# Patient Record
Sex: FEMALE | Race: WHITE | Employment: FULL TIME | URBAN - METROPOLITAN AREA
[De-identification: names, ages, dates, MRNs, and addresses within clinical notes are randomized per-mention and may not be internally consistent; named-entity substitution may affect disease eponyms.]

---

## 2018-06-11 ENCOUNTER — HOSPITAL ENCOUNTER (OUTPATIENT)
Dept: PREADMISSION TESTING | Age: 28
Discharge: HOME | End: 2018-06-11
Payer: COMMERCIAL

## 2018-06-11 LAB
HCT VFR BLD AUTO: 39.4 % (ref 35–45)
HGB BLD-MCNC: 13.3 G/DL (ref 12–16)

## 2018-06-11 PROCEDURE — 85018 HEMOGLOBIN: CPT | Performed by: SURGERY

## 2018-06-11 PROCEDURE — 36415 COLL VENOUS BLD VENIPUNCTURE: CPT | Performed by: SURGERY

## 2018-06-13 NOTE — H&P
PATIENT: Nahed Hodge  YOB: 1990  DATE: 2018 9:45 AM   VISIT TYPE: Consult  _____________________________________________________________    Completed Orders (this encounter)  Order Jasmyn Pal Interpretation Result Next Lab Date   surgery instructions given education         Assessment/Plan  # Detail Type Description    1. Assessment Unspecified lump in the right breast, lower outer quadrant (N63.13). 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 Patient Plan right breast bx                  This 33year old female presents for Breast lump. History of Present Illness:  1. Breast lump      Onset: 1 month ago. Severity 4. Duration 1 Month. There is no radiation. Location is right breast. The patient describes it as mobile and smooth. It occurs constantly. The problem is with no change. Symptom is aggravated by touch. Symptom is aggravated by touch. Denies relieving factors. Pertinent negatives include bloody discharge, dimpling, fever, lymphadenopathy, nipple itching and nipple retraction. Additional information: bx shows a atypical cells within a cellular fibroadenoma. PAST MEDICAL/SURGICAL HISTORY  (Detailed)  Patient reported no relevant past medical/surgical history. Family History:  (Detailed)  Relationship Family Member Name  Age at Death Condition Onset Age Cause of Death       Family history of Hypertension  N       Family history of Diabetes mellitus  N       Family history of Heart disease  N       Family history of Cancer, unknown  N       Social History:  (Detailed)  Tobacco use reviewed. Preferred language is [place]. Smoking status: Never smoker. SMOKING STATUS  Use Status Type Smoking Status Usage Per Day Years Used Total Pack Years   no/never  Never smoker                Patient Status   Completed with information received for patient transitioning into care. Medication Reconciliation  Medications reconciled today. Medication Reviewed  Adherence Medication Name Sig Desc Elsewhere Status   taking as directed Vyvanse 30 mg capsule take 1 capsule by oral route  every day in the morning Y Verified   taking as directed norethin-ethinyl estradiol-iron 0.8 mg-25 mcg(24)/75 mg(4) chew tablet chew 1 tablet by oral route  every day Y Verified   taking as directed Claritin Liqui-Gel 10 mg capsule  Y Verified     Allergies:  Ingredient Reaction Medication Name Comment   NO KNOWN ALLERGIES      (Reviewed, no changes.)  Review of Systems  System Neg/Pos Details   Constitutional Negative Fever, night sweats and weight loss. ENMT Negative Hearing loss, tinnitus, vertigo and voice change. Eyes Negative Diplopia and vision loss. Respiratory Negative Asthma, cough, dyspnea, hemoptysis, known TB exposure and wheezing. Cardio Negative Chest pain, claudication, edema, irregular heartbeat/palpitations and thrombophlebitis. GI Negative Bloating, dysphagia, hemorrhoids, jaundice and reflux.  Negative Dysuria, nocturia, passage stone/gravel and urinary incontinence. Endocrine Negative Cold intolerance and goiter. Neuro Positive Headache. Neuro Negative Syncope. Integumentary Negative Bloody discharge from breast, breast dimpling, change in shape/size of mole(s), nipple itching, nipple retraction and skin lesion. MS Negative Back pain and bone/joint symptoms. Hema/Lymph Negative Easy bleeding, easy bruising and lymphadenopathy. Allergic/Immuno Negative Contact allergy and contact dermatitis. Reproductive Positive Breast lump(s) (Side: right), Menses (Last menses was 06/02/2018). Reproductive Negative Breast discharge and breast pain. Vital Signs     Gynecologic History   Last menses was 06/02/2018.      Height  Time ft in cm Last Measured Height Position   9:45 AM 5.0 5.00 165.10       Weight/BSA/BMI  Time lb oz kg Context BMI kg/m2 BSA m2   9:45 AM 144.00  65.317 dressed with shoes 23.96      Blood Pressure  Time BP mm/Hg Position Side Site Method Cuff Size   9:45 /85 sitting right arm automatic adult     Temperature/Pulse/Respiration  Time Temp F Temp C Temp Site Pulse/min Pattern Resp/ min   9:45 AM 99.50 37.50 ear 88 regular      Measured By  Time Measured by   9:45 AM Virginia Benson       Physical Exam:  Exam Findings Details   Constitutional Normal No acute distress. Well nourished. Well developed. Eyes Normal General - Right: Normal, Left: Normal. Sclera - Right: Normal, Left: Normal.   Neck Exam Normal Inspection - Normal. Palpation - Normal. Parotid gland - Normal. Thyroid gland - Normal. Submandibular lymph nodes - Normal. Cervical lymph nodes - Normal.   Lymph Detail Normal Occipital. Postauricular. Preauricular. Submental. Submandibular. Parotid. Anterior cervical. Posterior cervical. Supraclavicular. Axillary. Breast * Palpation - Right: mass. Breast Comments 6 ocl 2 cm mass, smooth, mobile   Breast Normal Inspection - Bilateral: Normal. Palpation - Left: Normal.. Nipples - Bilateral: Normal. Lymph nodes - Normal.   Respiratory Normal Cough - Absent. Effort - Normal.   Cardiovascular Normal Inspection - JVD: Absent. Heart rate - Regular rate. Rhythm - Regular. Skin * Inspection - General inspection: warm and dry. Musculoskeletal Normal Visual overview of all four extremities is normal. Gait - Normal.   Extremity Normal No Cyanosis. No Edema. Neurological Normal Level of consciousness - Normal. Orientation - Normal. Memory - Normal.   Psychiatric Normal Orientation - Oriented to time, place, person & situation. No agitation. Not anxious. Appropriate mood and affect.          Medications (added, continued, or stopped this visit):  Started Medication Directions PRN Status PRN Reason Instruction Stopped    Claritin Liqui-Gel 10 mg capsule  N       norethin-ethinyl estradiol-iron 0.8 mg-25 mcg(24)/75 mg(4) chew tablet chew 1 tablet by oral route  every day N       Vyvanse 30 mg capsule take 1 capsule by oral route  every day in the morning N      Completed by:        Obi Dillon 06/04/2018 1:59 PM   Document generated by:  Yvonne Bishop 06/04/2018 01:59 PM     -----------------------------------------------------------------------------------------------------------                          Electronically signed by Yvonne Bishop MD on 06/05/2018 11:45 AM

## 2018-06-14 ENCOUNTER — ANESTHESIA EVENT (OUTPATIENT)
Dept: SURGERY | Age: 28
End: 2018-06-14
Payer: COMMERCIAL

## 2018-06-14 ENCOUNTER — HOSPITAL ENCOUNTER (OUTPATIENT)
Age: 28
Setting detail: OUTPATIENT SURGERY
Discharge: HOME | End: 2018-06-14
Attending: SURGERY | Admitting: SURGERY
Payer: COMMERCIAL

## 2018-06-14 ENCOUNTER — ANESTHESIA (OUTPATIENT)
Dept: SURGERY | Age: 28
End: 2018-06-14
Payer: COMMERCIAL

## 2018-06-14 VITALS
DIASTOLIC BLOOD PRESSURE: 73 MMHG | HEART RATE: 80 BPM | TEMPERATURE: 97.8 F | RESPIRATION RATE: 16 BRPM | BODY MASS INDEX: 24.02 KG/M2 | WEIGHT: 144.19 LBS | OXYGEN SATURATION: 99 % | SYSTOLIC BLOOD PRESSURE: 114 MMHG | HEIGHT: 65 IN

## 2018-06-14 DIAGNOSIS — N63.10 BREAST MASS, RIGHT: Primary | ICD-10-CM

## 2018-06-14 LAB — HCG SERPL QL: NEGATIVE

## 2018-06-14 PROCEDURE — 77030012508 HC MSK AIRWY LMA AMBU -A: Performed by: ANESTHESIOLOGY

## 2018-06-14 PROCEDURE — 74011250636 HC RX REV CODE- 250/636

## 2018-06-14 PROCEDURE — 84703 CHORIONIC GONADOTROPIN ASSAY: CPT | Performed by: SURGERY

## 2018-06-14 PROCEDURE — 76010000138 HC OR TIME 0.5 TO 1 HR: Performed by: SURGERY

## 2018-06-14 PROCEDURE — 74011000258 HC RX REV CODE- 258: Performed by: ANESTHESIOLOGY

## 2018-06-14 PROCEDURE — 77030018836 HC SOL IRR NACL ICUM -A: Performed by: SURGERY

## 2018-06-14 PROCEDURE — 88305 TISSUE EXAM BY PATHOLOGIST: CPT | Performed by: SURGERY

## 2018-06-14 PROCEDURE — 74011250636 HC RX REV CODE- 250/636: Performed by: ANESTHESIOLOGY

## 2018-06-14 PROCEDURE — 74011250636 HC RX REV CODE- 250/636: Performed by: SURGERY

## 2018-06-14 PROCEDURE — 77030002933 HC SUT MCRYL J&J -A: Performed by: SURGERY

## 2018-06-14 PROCEDURE — 76210000020 HC REC RM PH II FIRST 0.5 HR: Performed by: SURGERY

## 2018-06-14 PROCEDURE — 77030002916 HC SUT ETHLN J&J -A: Performed by: SURGERY

## 2018-06-14 PROCEDURE — 76210000017 HC OR PH I REC 1.5 TO 2 HR: Performed by: SURGERY

## 2018-06-14 PROCEDURE — 77030011267 HC ELECTRD BLD COVD -A: Performed by: SURGERY

## 2018-06-14 PROCEDURE — 74011000250 HC RX REV CODE- 250: Performed by: SURGERY

## 2018-06-14 PROCEDURE — 76060000032 HC ANESTHESIA 0.5 TO 1 HR: Performed by: SURGERY

## 2018-06-14 PROCEDURE — 77030020782 HC GWN BAIR PAWS FLX 3M -B: Performed by: SURGERY

## 2018-06-14 RX ORDER — DEXAMETHASONE SODIUM PHOSPHATE 4 MG/ML
INJECTION, SOLUTION INTRA-ARTICULAR; INTRALESIONAL; INTRAMUSCULAR; INTRAVENOUS; SOFT TISSUE AS NEEDED
Status: DISCONTINUED | OUTPATIENT
Start: 2018-06-14 | End: 2018-06-14 | Stop reason: HOSPADM

## 2018-06-14 RX ORDER — MIDAZOLAM HYDROCHLORIDE 1 MG/ML
INJECTION, SOLUTION INTRAMUSCULAR; INTRAVENOUS AS NEEDED
Status: DISCONTINUED | OUTPATIENT
Start: 2018-06-14 | End: 2018-06-14 | Stop reason: HOSPADM

## 2018-06-14 RX ORDER — DIPHENHYDRAMINE HYDROCHLORIDE 50 MG/ML
12.5 INJECTION, SOLUTION INTRAMUSCULAR; INTRAVENOUS
Status: DISCONTINUED | OUTPATIENT
Start: 2018-06-14 | End: 2018-06-14 | Stop reason: HOSPADM

## 2018-06-14 RX ORDER — SODIUM CHLORIDE 0.9 % (FLUSH) 0.9 %
5-10 SYRINGE (ML) INJECTION AS NEEDED
Status: DISCONTINUED | OUTPATIENT
Start: 2018-06-14 | End: 2018-06-14 | Stop reason: HOSPADM

## 2018-06-14 RX ORDER — SODIUM CHLORIDE, SODIUM LACTATE, POTASSIUM CHLORIDE, CALCIUM CHLORIDE 600; 310; 30; 20 MG/100ML; MG/100ML; MG/100ML; MG/100ML
1000 INJECTION, SOLUTION INTRAVENOUS CONTINUOUS
Status: DISCONTINUED | OUTPATIENT
Start: 2018-06-14 | End: 2018-06-14 | Stop reason: HOSPADM

## 2018-06-14 RX ORDER — KETOROLAC TROMETHAMINE 30 MG/ML
INJECTION, SOLUTION INTRAMUSCULAR; INTRAVENOUS AS NEEDED
Status: DISCONTINUED | OUTPATIENT
Start: 2018-06-14 | End: 2018-06-14 | Stop reason: HOSPADM

## 2018-06-14 RX ORDER — NALOXONE HYDROCHLORIDE 0.4 MG/ML
0.2 INJECTION, SOLUTION INTRAMUSCULAR; INTRAVENOUS; SUBCUTANEOUS AS NEEDED
Status: DISCONTINUED | OUTPATIENT
Start: 2018-06-14 | End: 2018-06-14 | Stop reason: HOSPADM

## 2018-06-14 RX ORDER — MAGNESIUM SULFATE 100 %
4 CRYSTALS MISCELLANEOUS AS NEEDED
Status: DISCONTINUED | OUTPATIENT
Start: 2018-06-14 | End: 2018-06-14 | Stop reason: HOSPADM

## 2018-06-14 RX ORDER — DEXTROSE 50 % IN WATER (D50W) INTRAVENOUS SYRINGE
25-50 AS NEEDED
Status: DISCONTINUED | OUTPATIENT
Start: 2018-06-14 | End: 2018-06-14 | Stop reason: HOSPADM

## 2018-06-14 RX ORDER — TRAMADOL HYDROCHLORIDE 50 MG/1
50 TABLET ORAL
Qty: 20 TAB | Refills: 0 | Status: SHIPPED | OUTPATIENT
Start: 2018-06-14

## 2018-06-14 RX ORDER — ONDANSETRON 2 MG/ML
INJECTION INTRAMUSCULAR; INTRAVENOUS AS NEEDED
Status: DISCONTINUED | OUTPATIENT
Start: 2018-06-14 | End: 2018-06-14 | Stop reason: HOSPADM

## 2018-06-14 RX ORDER — PROPOFOL 10 MG/ML
INJECTION, EMULSION INTRAVENOUS AS NEEDED
Status: DISCONTINUED | OUTPATIENT
Start: 2018-06-14 | End: 2018-06-14 | Stop reason: HOSPADM

## 2018-06-14 RX ORDER — SODIUM CHLORIDE, SODIUM LACTATE, POTASSIUM CHLORIDE, CALCIUM CHLORIDE 600; 310; 30; 20 MG/100ML; MG/100ML; MG/100ML; MG/100ML
125 INJECTION, SOLUTION INTRAVENOUS CONTINUOUS
Status: DISCONTINUED | OUTPATIENT
Start: 2018-06-14 | End: 2018-06-14 | Stop reason: HOSPADM

## 2018-06-14 RX ORDER — HYDROMORPHONE HYDROCHLORIDE 2 MG/ML
0.5 INJECTION, SOLUTION INTRAMUSCULAR; INTRAVENOUS; SUBCUTANEOUS
Status: DISCONTINUED | OUTPATIENT
Start: 2018-06-14 | End: 2018-06-14 | Stop reason: HOSPADM

## 2018-06-14 RX ORDER — FENTANYL CITRATE 50 UG/ML
INJECTION, SOLUTION INTRAMUSCULAR; INTRAVENOUS AS NEEDED
Status: DISCONTINUED | OUTPATIENT
Start: 2018-06-14 | End: 2018-06-14 | Stop reason: HOSPADM

## 2018-06-14 RX ORDER — BUPIVACAINE HYDROCHLORIDE 5 MG/ML
INJECTION, SOLUTION EPIDURAL; INTRACAUDAL AS NEEDED
Status: DISCONTINUED | OUTPATIENT
Start: 2018-06-14 | End: 2018-06-14 | Stop reason: HOSPADM

## 2018-06-14 RX ORDER — FENTANYL CITRATE 50 UG/ML
25 INJECTION, SOLUTION INTRAMUSCULAR; INTRAVENOUS AS NEEDED
Status: DISCONTINUED | OUTPATIENT
Start: 2018-06-14 | End: 2018-06-14 | Stop reason: HOSPADM

## 2018-06-14 RX ORDER — ALBUTEROL SULFATE 0.83 MG/ML
2.5 SOLUTION RESPIRATORY (INHALATION) AS NEEDED
Status: DISCONTINUED | OUTPATIENT
Start: 2018-06-14 | End: 2018-06-14 | Stop reason: HOSPADM

## 2018-06-14 RX ORDER — FLUMAZENIL 0.1 MG/ML
0.2 INJECTION INTRAVENOUS
Status: DISCONTINUED | OUTPATIENT
Start: 2018-06-14 | End: 2018-06-14 | Stop reason: HOSPADM

## 2018-06-14 RX ORDER — ONDANSETRON 4 MG/1
4 TABLET, ORALLY DISINTEGRATING ORAL
Qty: 10 TAB | Refills: 0 | Status: SHIPPED | OUTPATIENT
Start: 2018-06-14

## 2018-06-14 RX ADMIN — MIDAZOLAM HYDROCHLORIDE 3 MG: 1 INJECTION, SOLUTION INTRAMUSCULAR; INTRAVENOUS at 11:04

## 2018-06-14 RX ADMIN — PROMETHAZINE HYDROCHLORIDE 12.5 MG: 25 INJECTION, SOLUTION INTRAMUSCULAR; INTRAVENOUS at 13:02

## 2018-06-14 RX ADMIN — PROPOFOL 180 MG: 10 INJECTION, EMULSION INTRAVENOUS at 11:10

## 2018-06-14 RX ADMIN — SODIUM CHLORIDE, SODIUM LACTATE, POTASSIUM CHLORIDE, AND CALCIUM CHLORIDE 125 ML/HR: 600; 310; 30; 20 INJECTION, SOLUTION INTRAVENOUS at 09:31

## 2018-06-14 RX ADMIN — MIDAZOLAM HYDROCHLORIDE 2 MG: 1 INJECTION, SOLUTION INTRAMUSCULAR; INTRAVENOUS at 11:09

## 2018-06-14 RX ADMIN — DEXAMETHASONE SODIUM PHOSPHATE 4 MG: 4 INJECTION, SOLUTION INTRA-ARTICULAR; INTRALESIONAL; INTRAMUSCULAR; INTRAVENOUS; SOFT TISSUE at 11:15

## 2018-06-14 RX ADMIN — KETOROLAC TROMETHAMINE 30 MG: 30 INJECTION, SOLUTION INTRAMUSCULAR; INTRAVENOUS at 11:25

## 2018-06-14 RX ADMIN — FENTANYL CITRATE 50 MCG: 50 INJECTION, SOLUTION INTRAMUSCULAR; INTRAVENOUS at 11:18

## 2018-06-14 RX ADMIN — FENTANYL CITRATE 50 MCG: 50 INJECTION, SOLUTION INTRAMUSCULAR; INTRAVENOUS at 11:19

## 2018-06-14 RX ADMIN — SODIUM CHLORIDE, SODIUM LACTATE, POTASSIUM CHLORIDE, AND CALCIUM CHLORIDE 125 ML/HR: 600; 310; 30; 20 INJECTION, SOLUTION INTRAVENOUS at 12:58

## 2018-06-14 RX ADMIN — ONDANSETRON 4 MG: 2 INJECTION INTRAMUSCULAR; INTRAVENOUS at 11:15

## 2018-06-14 NOTE — PERIOP NOTES
Gave prescription to patient's family to  from outpatient pharmacy. She gave me the patient's phone to give to her.   Patient has phone with her in PACU

## 2018-06-14 NOTE — INTERVAL H&P NOTE
H&P Update:  Venita Marion was seen and examined. History and physical has been reviewed. The patient has been examined. There have been no significant clinical changes since the completion of the originally dated History and Physical.  Patient identified by surgeon; surgical site was confirmed by patient and surgeon.     Signed By: Lorice Mcardle, MD     June 14, 2018 11:07 AM

## 2018-06-14 NOTE — PERIOP NOTES
Patient is experiencing nausea. Paged Dr Lo Ulrich and received an order for 12.5 mg of Phenergan IV.   Will continue to monitor

## 2018-06-14 NOTE — ANESTHESIA PREPROCEDURE EVALUATION
Anesthetic History     PONV          Review of Systems / Medical History  Patient summary reviewed, nursing notes reviewed and pertinent labs reviewed    Pulmonary  Within defined limits                 Neuro/Psych   Within defined limits           Cardiovascular  Within defined limits                Exercise tolerance: >4 METS     GI/Hepatic/Renal  Within defined limits              Endo/Other  Within defined limits           Other Findings              Physical Exam    Airway  Mallampati: II  TM Distance: 4 - 6 cm  Neck ROM: normal range of motion   Mouth opening: Normal     Cardiovascular  Regular rate and rhythm,  S1 and S2 normal,  no murmur, click, rub, or gallop             Dental  No notable dental hx       Pulmonary  Breath sounds clear to auscultation               Abdominal  GI exam deferred       Other Findings            Anesthetic Plan    ASA: 1  Anesthesia type: general          Induction: Intravenous  Anesthetic plan and risks discussed with: Patient

## 2018-06-14 NOTE — OP NOTES
[address] REPORT    Jose Antonio Shoulders  MR#: [id number]  : 1990  ACCOUNT #: [de-identified]   DATE OF SERVICE: 2018    PREOPERATIVE DIAGNOSIS:  Right breast mass. POSTOPERATIVE DIAGNOSIS:  Right breast mass. PROCEDURE PERFORMED:  Right breast biopsy. SURGEON:  Flori Santana. Brittny Sahni MD    ASSISTANT:  King Macias     ANESTHESIA:  General and local.    INDICATION FOR PROCEDURE:  This is a 52-year-old female with a palpable right breast mass, which was biopsied to show some atypia. It is consistent with a fibroadenoma. She is brought to the operating room for right breast biopsy. DESCRIPTION OF THE PROCEDURE:  The patient was brought in the operating room and placed on the table in the supine position. After placing monitors and adequate general anesthesia, the right breast was prepped and draped in the usual sterile fashion. The mass was at the 7 o'clock position in the right breast.  An incision was made through the skin down to subcutaneous tissue. Then, using electrocautery dissection, a lobulated mass was excised from the surrounding normal soft fibroadipose breast tissue. It was sent to pathology in formalin. There was good hemostasis with electrocautery. Subcutaneous tissue was reapproximated with interrupted 3-0 Monocryl suture. A 4-0 Monocryl was used to reapproximate the skin. Steri-Strips were applied and the wounds were dressed sterilely. The sponge, instrument and needle count was correct at the end of the procedure. ESTIMATED BLOOD LOSS:  2 mL    SPECIMENS REMOVED:  Right breast mass. IMPLANTS:  No implants. DRAINS:  No drains. COMPLICATIONS:  No complications. MD Annie Casas / ESTELA RILEY [facility]  D: 2018 11:26     T: 2018 12:09  JOB #: [id number]

## 2018-06-14 NOTE — PERIOP NOTES
Reviewed PTA medication list with patient/caregiver and patient/caregiver denies any additional medications. Patient admits to having a responsible adult care for them for at least 24 hours after surgery.     Dual skin assessment completed by Joelle Paniagua RN and Vilma Alcantar RN.

## 2018-06-14 NOTE — PERIOP NOTES
Was not able to contact family.   Voicemail says that patient doesn't get messages and was not in either waiting room

## 2018-06-14 NOTE — IP AVS SNAPSHOT
[address] [place] [id number] 
[phone] Patient: Rosalinda Pickett MRN: [id number] VGK:[id number] About your hospitalization You were admitted on:  June 14, 2018 You last received care in the:  THE [facility] PACU You were discharged on:  June 14, 2018 Why you were hospitalized Your primary diagnosis was:  Not on File Follow-up Information Follow up With Details Comments Contact Info Rochelle Kelly MD   [address] [place] [address] 
[phone] Discharge Orders None A check amish indicates which time of day the medication should be taken. My Medications START taking these medications Instructions Each Dose to Equal  
 Morning Noon Evening Bedtime  
 ondansetron 4 mg disintegrating tablet Commonly known as:  ZOFRAN ODT Your last dose was: Your next dose is: Take 1 Tab by mouth every eight (8) hours as needed for Nausea. 4 mg  
    
   
   
   
  
 traMADol 50 mg tablet Commonly known as:  ULTRAM  
   
Your last dose was: Your next dose is: Take 1 Tab by mouth every six (6) hours as needed for Pain. Max Daily Amount: 200 mg.  
 50 mg CONTINUE taking these medications Instructions Each Dose to Equal  
 Morning Noon Evening Bedtime TRI-LO-ESTARYLLA PO Your last dose was: Your next dose is: Take  by mouth. VYVANSE 30 mg capsule Generic drug:  lisdexamfetamine Your last dose was: Your next dose is: Take 30 mg by mouth every morningIndications: Attention-Deficit Hyperactivity Disorder. 30 mg XYZAL 5 mg tablet Generic drug:  levocetirizine Your last dose was: Your next dose is: Take 5 mg by mouth daily. 5 mg Where to Get Your Medications Information on where to get these meds will be given to you by the nurse or doctor. ! Ask your nurse or doctor about these medications  
  ondansetron 4 mg disintegrating tablet  
 traMADol 50 mg tablet Opioid Education Prescription Opioids: What You Need to Know: 
 
 
After general anesthesia or intravenous sedation, for 24 hours or while taking prescription Narcotics: · Limit your activities · Do not drive and operate hazardous machinery · Do not make important personal or business decisions · Do  not drink alcoholic beverages · If you have not urinated within 8 hours after discharge, please contact your surgeon on call. Report the following to your surgeon: 
· Excessive pain, swelling, redness or odor of or around the surgical area · Temperature over 100.5 · Nausea and vomiting lasting longer than 4 hours or if unable to take medications · Any signs of decreased circulation or nerve impairment to extremity: change in color, persistent  numbness, tingling, coldness or increase pain · Any questions What to do at Home: 
LIGHT DIET TODAY ADVANCE AS TOLERATED 
KEEP DRESSING CLEAN AND DRY FOR 50 HRS 
SHOWER GETTING BREAST WET IN 50 HRS 
NO SOAPS LOTIONS OR OILS ON BREAST 
[address] 24/7 RETURN TO OFFICE IN 1 WEEK CALL FOR APPT If you experience any of the following symptoms heavy bleeding, fevers, severe pain, please follow up with dr Aj Crump *  Please give a list of your current medications to your Primary Care Provider. *  Please update this list whenever your medications are discontinued, doses are 
    changed, or new medications (including over-the-counter products) are added. *  Please carry medication information at all times in case of emergency situations. These are general instructions for a healthy lifestyle: No smoking/ No tobacco products/ Avoid exposure to second hand smoke Surgeon General's Warning:  Quitting smoking now greatly reduces serious risk to your health. Obesity, smoking, and sedentary lifestyle greatly increases your risk for illness A healthy diet, regular physical exercise & weight monitoring are important for maintaining a healthy lifestyle You may be retaining fluid if you have a history of heart failure or if you experience any of the following symptoms:  Weight gain of 3 pounds or more overnight or 5 pounds in a week, increased swelling in our hands or feet or shortness of breath while lying flat in bed. Please call your doctor as soon as you notice any of these symptoms; do not wait until your next office visit. Recognize signs and symptoms of STROKE: 
 
F-face looks uneven A-arms unable to move or move unevenly S-speech slurred or non-existent T-time-call 911 as soon as signs and symptoms begin-DO NOT go Back to bed or wait to see if you get better-TIME IS BRAIN. Warning Signs of HEART ATTACK Call 911 if you have these symptoms: 
? Chest discomfort. Most heart attacks involve discomfort in the center of the chest that lasts more than a few minutes, or that goes away and comes back. It can feel like uncomfortable pressure, squeezing, fullness, or pain. ? Discomfort in other areas of the upper body. Symptoms can include pain or discomfort in one or both arms, the back, neck, jaw, or stomach. ? Shortness of breath with or without chest discomfort. ? Other signs may include breaking out in a cold sweat, nausea, or lightheadedness. Don't wait more than five minutes to call [address]! Fast action can save your life.  Calling 911 is almost always the fastest way to get lifesaving treatment. Emergency Medical Services staff can begin treatment when they arrive  up to an hour sooner than if someone gets to the hospital by car. The discharge information has been reviewed with the patient and caregiver. The patient and caregiver verbalized understanding. Discharge medications reviewed with the patient and caregiver and appropriate educational materials and side effects teaching were provided. Patient armband removed and shredded 
___________________________________________________________________________________________________________________________________ Introducing [facility]! Dear Paulo Russell: Thank you for requesting a [employer]Me account. Our records indicate that you already have an active TranscribeMe account. You can access your account anytime at [email or web address] Did you know that you can access your hospital and ER discharge instructions at any time in TranscribeMe? You can also review all of your test results from your hospital stay or ER visit. Additional Information If you have questions, please visit the Frequently Asked Questions section of the TranscribeMe website at [email or web address]/. Remember, TranscribeMe is NOT to be used for urgent needs. For medical emergencies, dial 911. Now available from your iPhone and Android! Introducing Kash Ruano As a [facility] patient, I wanted to make you aware of our electronic visit tool called [facility]. [facility] 24/7 allows you to connect within minutes with a medical provider 24 hours a day, seven days a week via a mobile device or tablet or logging into a secure website from your computer. You can access Kash Ruano from anywhere in the [place].  
 
A virtual visit might be right for you when you have a simple condition and feel like you just dont want to get out of bed, or cant get away from work for an appointment, when your regular [facility] provider is not available (evenings, weekends or holidays), or when youre out of town and need minor care. Electronic visits cost only $49 and if the [facility] [facility] 24/7 provider determines a prescription is needed to treat your condition, one can be electronically transmitted to a nearby pharmacy*. Please take a moment to enroll today if you have not already done so. The enrollment process is free and takes just a few minutes. To enroll, please download the [employer] thomas to your tablet or phone, or visit [email or web address] to enroll on your computer. And, as an [address] [place] [address] patient with a [employer] [place] account, the results of your visits will be scanned into your electronic medical record and your primary care provider will be able to view the scanned results. We urge you to continue to see your regular [facility] provider for your ongoing medical care. And while your primary care provider may not be the one available when you seek a [employer] virtual visit, the peace of mind you get from getting a real diagnosis real time can be priceless. For more information on [employer], view our Frequently Asked Questions (FAQs) at [email or web address]. Sincerely, 
 
Sara Pressley MD 
Chief Medical Officer [employer] *:  certain medications cannot be prescribed via [employer] Unresulted tests-please follow up with your PCP on these results Procedure/Test Authorizing Provider HCG QL SERUM Kimberly Romero MD  
  
Providers Seen During Your Hospitalization Provider Specialty Primary office phone Kimberly Romero MD General Surgery [phone] Your Primary Care Physician (PCP) Primary Care Physician Office Phone Office Fax Eli Rod [phone] You are allergic to the following No active allergies Recent Documentation Height Weight BMI OB Status Smoking Status 1.651 m 65.4 kg 23.99 kg/m2 Having regular periods Passive Smoke Exposure - Never Smoker Emergency Contacts Name Discharge Info Relation Home Work Mobile [facility] CAREGIVER [3] Mother [14] [phone] Patient Belongings The following personal items are in your possession at time of discharge: 
  Dental Appliances: None  Visual Aid: None      Home Medications: None   Jewelry: None  Clothing: Pants, Shirt, Footwear, Undergarments, Hat (locker 20)    Other Valuables: None (states her mother has her wallet and cell) Please provide this summary of care documentation to your next provider. Signatures-by signing, you are acknowledging that this After Visit Summary has been reviewed with you and you have received a copy. Patient Signature:  ____________________________________________________________ Date:  ____________________________________________________________  
  
[facility] Provider Signature:  ____________________________________________________________ Date:  ____________________________________________________________

## 2018-06-14 NOTE — ANESTHESIA POSTPROCEDURE EVALUATION
Post-Anesthesia Evaluation and Assessment    Patient: Karla Holliday MRN: [id number]  SSN: [id number]    YOB: 1990  Age: 33 y.o. Sex: female       Cardiovascular Function/Vital Signs  Visit Vitals    /76    Pulse 86    Temp 36.4 °C (97.5 °F)    Resp 15    Ht 5' 5\" (1.651 m)    Wt 65.4 kg (144 lb 3 oz)    SpO2 97%    BMI 23.99 kg/m2       Patient is status post general anesthesia for Procedure(s):  EXCISION RIGHT BREAST MASS . Nausea/Vomiting: None    Postoperative hydration reviewed and adequate. Pain:  Pain Scale 1: Numeric (0 - 10) (06/14/18 1245)  Pain Intensity 1: 0 (06/14/18 1245)   Managed    Neurological Status:   Neuro (WDL): Within Defined Limits (06/14/18 1245)   At baseline    Mental Status and Level of Consciousness: Arousable    Pulmonary Status:   O2 Device: Room air (06/14/18 1245)   Adequate oxygenation and airway patent    Complications related to anesthesia: None    Post-anesthesia assessment completed.  No concerns        Signed By: Leonidas Jimenez CRNA     June 14, 2018

## 2018-06-14 NOTE — DISCHARGE INSTRUCTIONS
DISCHARGE SUMMARY from Nurse    PATIENT INSTRUCTIONS:    After general anesthesia or intravenous sedation, for 24 hours or while taking prescription Narcotics:  · Limit your activities  · Do not drive and operate hazardous machinery  · Do not make important personal or business decisions  · Do  not drink alcoholic beverages  · If you have not urinated within 8 hours after discharge, please contact your surgeon on call. Report the following to your surgeon:  · Excessive pain, swelling, redness or odor of or around the surgical area  · Temperature over 100.5  · Nausea and vomiting lasting longer than 4 hours or if unable to take medications  · Any signs of decreased circulation or nerve impairment to extremity: change in color, persistent  numbness, tingling, coldness or increase pain  · Any questions    What to do at Home:  LIGHT DIET TODAY ADVANCE AS TOLERATED  KEEP DRESSING CLEAN AND DRY FOR 48 HRS  SHOWER GETTING BREAST WET IN 50 HRS  NO SOAPS LOTIONS OR OILS ON BREAST  [address] [facility] 24/7  RETURN TO OFFICE IN 1 WEEK CALL FOR APPT    If you experience any of the following symptoms heavy bleeding, fevers, severe pain, please follow up with dr Flor Mo    *  Please give a list of your current medications to your Primary Care Provider. *  Please update this list whenever your medications are discontinued, doses are      changed, or new medications (including over-the-counter products) are added. *  Please carry medication information at all times in case of emergency situations. These are general instructions for a healthy lifestyle:    No smoking/ No tobacco products/ Avoid exposure to second hand smoke  Surgeon General's Warning:  Quitting smoking now greatly reduces serious risk to your health.     Obesity, smoking, and sedentary lifestyle greatly increases your risk for illness    A healthy diet, regular physical exercise & weight monitoring are important for maintaining a healthy lifestyle    You may be retaining fluid if you have a history of heart failure or if you experience any of the following symptoms:  Weight gain of 3 pounds or more overnight or 5 pounds in a week, increased swelling in our hands or feet or shortness of breath while lying flat in bed. Please call your doctor as soon as you notice any of these symptoms; do not wait until your next office visit. Recognize signs and symptoms of STROKE:    F-face looks uneven    A-arms unable to move or move unevenly    S-speech slurred or non-existent    T-time-call 911 as soon as signs and symptoms begin-DO NOT go       Back to bed or wait to see if you get better-TIME IS BRAIN. Warning Signs of HEART ATTACK     Call 911 if you have these symptoms:   Chest discomfort. Most heart attacks involve discomfort in the center of the chest that lasts more than a few minutes, or that goes away and comes back. It can feel like uncomfortable pressure, squeezing, fullness, or pain.  Discomfort in other areas of the upper body. Symptoms can include pain or discomfort in one or both arms, the back, neck, jaw, or stomach.  Shortness of breath with or without chest discomfort.  Other signs may include breaking out in a cold sweat, nausea, or lightheadedness. Don't wait more than five minutes to call 911 - MINUTES MATTER! Fast action can save your life. Calling 911 is almost always the fastest way to get lifesaving treatment. Emergency Medical Services staff can begin treatment when they arrive -- up to an hour sooner than if someone gets to the hospital by car. The discharge information has been reviewed with the patient and caregiver. The patient and caregiver verbalized understanding. Discharge medications reviewed with the patient and caregiver and appropriate educational materials and side effects teaching were provided.     Patient armband removed and shredded  ___________________________________________________________________________________________________________________________________

## (undated) DEVICE — INSULATED BLADE ELECTRODE: Brand: EDGE

## (undated) DEVICE — (D)PREP SKN CHLRAPRP APPL 26ML -- CONVERT TO ITEM 371833

## (undated) DEVICE — MINOR: Brand: MEDLINE INDUSTRIES, INC.

## (undated) DEVICE — SUTURE ETHLN SZ 4-0 L18IN NONABSORBABLE BLK L19MM PS-2 3/8 1667H

## (undated) DEVICE — GOWN,AURORA,NONRNF,XL,30/CS: Brand: MEDLINE

## (undated) DEVICE — SUTURE MCRYL SZ 3-0 L27IN ABSRB UD L26MM SH 1/2 CIR Y416H

## (undated) DEVICE — SOL IRRIGATION INJ NACL 0.9% 500ML BTL

## (undated) DEVICE — SPONGE GZ W4XL4IN COT 12 PLY TYP VII WVN C FLD DSGN